# Patient Record
Sex: FEMALE | Race: BLACK OR AFRICAN AMERICAN | NOT HISPANIC OR LATINO | Employment: UNEMPLOYED | URBAN - METROPOLITAN AREA
[De-identification: names, ages, dates, MRNs, and addresses within clinical notes are randomized per-mention and may not be internally consistent; named-entity substitution may affect disease eponyms.]

---

## 2022-06-25 RX ORDER — ALBUTEROL SULFATE 90 UG/1
AEROSOL, METERED RESPIRATORY (INHALATION)
COMMUNITY
Start: 2021-12-26

## 2023-08-11 ENCOUNTER — HOSPITAL ENCOUNTER (EMERGENCY)
Facility: HOSPITAL | Age: 28
Discharge: HOME | End: 2023-08-11
Payer: MEDICAID

## 2023-08-11 VITALS
HEIGHT: 67 IN | HEART RATE: 87 BPM | SYSTOLIC BLOOD PRESSURE: 104 MMHG | BODY MASS INDEX: 45.99 KG/M2 | DIASTOLIC BLOOD PRESSURE: 66 MMHG | RESPIRATION RATE: 18 BRPM | WEIGHT: 293 LBS | TEMPERATURE: 98 F | OXYGEN SATURATION: 98 %

## 2023-08-11 DIAGNOSIS — U07.1 COVID-19: ICD-10-CM

## 2023-08-11 DIAGNOSIS — Z34.90 PREGNANCY, UNSPECIFIED GESTATIONAL AGE: Primary | ICD-10-CM

## 2023-08-11 DIAGNOSIS — R05.1 ACUTE COUGH: ICD-10-CM

## 2023-08-11 LAB
ALBUMIN SERPL-MCNC: 3.4 G/DL (ref 3.5–5.2)
ALBUMIN/GLOB SERPL: 1.1 G/DL
ALP SERPL-CCNC: 48 U/L (ref 39–117)
ALT SERPL W P-5'-P-CCNC: 55 U/L (ref 1–33)
ANION GAP SERPL CALCULATED.3IONS-SCNC: 12 MMOL/L (ref 5–15)
AST SERPL-CCNC: 44 U/L (ref 1–32)
BASOPHILS # BLD AUTO: 0 10*3/MM3 (ref 0–0.2)
BASOPHILS NFR BLD AUTO: 0.3 % (ref 0–1.5)
BILIRUB SERPL-MCNC: 0.4 MG/DL (ref 0–1.2)
BUN SERPL-MCNC: 5 MG/DL (ref 6–20)
BUN/CREAT SERPL: 8.9 (ref 7–25)
CALCIUM SPEC-SCNC: 8.9 MG/DL (ref 8.6–10.5)
CHLORIDE SERPL-SCNC: 104 MMOL/L (ref 98–107)
CO2 SERPL-SCNC: 22 MMOL/L (ref 22–29)
CREAT SERPL-MCNC: 0.56 MG/DL (ref 0.57–1)
CRP SERPL-MCNC: 1.54 MG/DL (ref 0–0.5)
DEPRECATED RDW RBC AUTO: 37.6 FL (ref 37–54)
EGFRCR SERPLBLD CKD-EPI 2021: 129.3 ML/MIN/1.73
EOSINOPHIL # BLD AUTO: 0.4 10*3/MM3 (ref 0–0.4)
EOSINOPHIL NFR BLD AUTO: 4 % (ref 0.3–6.2)
ERYTHROCYTE [DISTWIDTH] IN BLOOD BY AUTOMATED COUNT: 13.2 % (ref 12.3–15.4)
GLOBULIN UR ELPH-MCNC: 3.2 GM/DL
GLUCOSE SERPL-MCNC: 120 MG/DL (ref 65–99)
HCT VFR BLD AUTO: 35.3 % (ref 34–46.6)
HGB BLD-MCNC: 11.6 G/DL (ref 12–15.9)
HOLD SPECIMEN: NORMAL
LDH SERPL-CCNC: 280 U/L (ref 135–214)
LYMPHOCYTES # BLD AUTO: 2 10*3/MM3 (ref 0.7–3.1)
LYMPHOCYTES NFR BLD AUTO: 22.1 % (ref 19.6–45.3)
MCH RBC QN AUTO: 26.9 PG (ref 26.6–33)
MCHC RBC AUTO-ENTMCNC: 33 G/DL (ref 31.5–35.7)
MCV RBC AUTO: 81.7 FL (ref 79–97)
MONOCYTES # BLD AUTO: 0.8 10*3/MM3 (ref 0.1–0.9)
MONOCYTES NFR BLD AUTO: 8.5 % (ref 5–12)
NEUTROPHILS NFR BLD AUTO: 6 10*3/MM3 (ref 1.7–7)
NEUTROPHILS NFR BLD AUTO: 65.1 % (ref 42.7–76)
NRBC BLD AUTO-RTO: 0 /100 WBC (ref 0–0.2)
PLATELET # BLD AUTO: 268 10*3/MM3 (ref 140–450)
PMV BLD AUTO: 8.3 FL (ref 6–12)
POTASSIUM SERPL-SCNC: 4 MMOL/L (ref 3.5–5.2)
PROT SERPL-MCNC: 6.6 G/DL (ref 6–8.5)
RBC # BLD AUTO: 4.32 10*6/MM3 (ref 3.77–5.28)
SODIUM SERPL-SCNC: 138 MMOL/L (ref 136–145)
WBC NRBC COR # BLD: 9.2 10*3/MM3 (ref 3.4–10.8)
WHOLE BLOOD HOLD COAG: NORMAL

## 2023-08-11 PROCEDURE — 85025 COMPLETE CBC W/AUTO DIFF WBC: CPT | Performed by: NURSE PRACTITIONER

## 2023-08-11 PROCEDURE — 83615 LACTATE (LD) (LDH) ENZYME: CPT | Performed by: NURSE PRACTITIONER

## 2023-08-11 PROCEDURE — 80053 COMPREHEN METABOLIC PANEL: CPT | Performed by: NURSE PRACTITIONER

## 2023-08-11 PROCEDURE — 86140 C-REACTIVE PROTEIN: CPT | Performed by: NURSE PRACTITIONER

## 2023-08-11 PROCEDURE — 99283 EMERGENCY DEPT VISIT LOW MDM: CPT

## 2023-08-11 PROCEDURE — 93005 ELECTROCARDIOGRAM TRACING: CPT

## 2023-08-11 RX ORDER — SODIUM CHLORIDE 0.9 % (FLUSH) 0.9 %
10 SYRINGE (ML) INJECTION AS NEEDED
Status: DISCONTINUED | OUTPATIENT
Start: 2023-08-11 | End: 2023-08-11 | Stop reason: HOSPADM

## 2023-08-11 NOTE — ED NOTES
Pt reports cough that started last Wednesday when she tested positive for COVID. She is coughing up clear secretions and sometimes yellow or bloody secretions. Has SOA

## 2023-08-11 NOTE — ED PROVIDER NOTES
Subjective   History of Present Illness  Chief complaint: COVID-positive      Context: Patient is a 27-year-old female presents complaining of a cough.  States she tested positive for COVID 9 days ago after the family tested positive.  She states she has never had a and has not been vaccinated.  She is currently 17 weeks pregnant and denies any abdominal pain or cramping vaginal bleeding urinary complaints.  States she had a fever 9 days ago and none since then.        PCP:  jose      Review of Systems   Constitutional:  Negative for fever.     No past medical history on file.    No Known Allergies    No past surgical history on file.    No family history on file.    Social History     Socioeconomic History    Marital status: Single           Objective   Physical Exam    Vital signs and triage nurse note reviewed.  Constitutional: Awake, alert; well-developed and well-nourished. No acute distress is noted.  Nontoxic in appearance.  BMI over 47  HEENT: Normocephalic, atraumatic;  with intact EOM; oropharynx is pink and moist without exudate or erythema.  Neck: Supple, full range of motion without pain;    Cardiovascular: Regular rate and rhythm, normal S1-S2.  Pulmonary: Respiratory effort regular nonlabored, breath sounds clear to auscultation all fields.  Abdomen: Soft, nontender nondistended with normoactive bowel sounds; no rebound or guarding.  Musculoskeletal: Independent range of motion of all extremities with no palpable tenderness or edema.  Neuro: Alert oriented x3, speech is clear and appropriate, GCS 15  Skin:  Fleshtone warm, dry, intact;        Procedures           ED Course      Labs Reviewed   COMPREHENSIVE METABOLIC PANEL - Abnormal; Notable for the following components:       Result Value    Glucose 120 (*)     BUN 5 (*)     Creatinine 0.56 (*)     Albumin 3.4 (*)     ALT (SGPT) 55 (*)     AST (SGOT) 44 (*)     All other components within normal limits    Narrative:     GFR Normal >60  Chronic  "Kidney Disease <60  Kidney Failure <15     LACTATE DEHYDROGENASE - Abnormal; Notable for the following components:     (*)     All other components within normal limits   C-REACTIVE PROTEIN - Abnormal; Notable for the following components:    C-Reactive Protein 1.54 (*)     All other components within normal limits   CBC WITH AUTO DIFFERENTIAL - Abnormal; Notable for the following components:    Hemoglobin 11.6 (*)     All other components within normal limits   CBC AND DIFFERENTIAL    Narrative:     The following orders were created for panel order CBC & Differential.  Procedure                               Abnormality         Status                     ---------                               -----------         ------                     CBC Auto Differential[[id number]]        Abnormal            Final result                 Please view results for these tests on the individual orders.   EXTRA TUBES    Narrative:     The following orders were created for panel order Extra Tubes.  Procedure                               Abnormality         Status                     ---------                               -----------         ------                     Gold Top - SST[[id number]]                                   Final result               Light Blue Top[[id number]]                                   Final result                 Please view results for these tests on the individual orders.   GOLD TOP - SST   LIGHT BLUE TOP     Medications - No data to display  No radiology results for the last day  Prior to Admission medications    Medication Sig Start Date End Date Taking? Authorizing Provider   dextromethorphan 15 MG/5ML syrup Take 5 mL by mouth 4 (Four) Times a Day As Needed for Cough. 8/11/23   Vaishnavi Dee, NAYELY                                          Medical Decision Making      /66   Pulse 87   Temp 98 øF (36.7 øC)   Resp 18   Ht 170.2 cm (67\")   Wt (!) 139 kg (306 lb)   SpO2 98%   BMI " 47.93 kg/mý         Radiology interpretation: Considered but not felt emergently warranted as patient is pregnant  Lab interpretation:  Labs all viewed by me and significant for, glucose 120, mildly elevated LFTs, hemolyzed LDH at 280         Appropriate PPE worn during exam.  Patient had an IV established labs obtained, she has No clinical findings suspicious for bacterial pneumonia and is not hypoxic or requiring supplemental oxygen.  She is no longer a candidate for Paxlovid due to onset time of symptoms.  She will be treated symptomatically and we discussed importance of follow-up with her OB/GYN         i discussed findings with patient who voices understanding of discharge instructions, signs and symptoms requiring return to ED; discharged improved and in stable condition with follow up for re-evaluation.  This document is intended for medical expert use only. Reading of this document by patients and/or patient's family without participating medical staff guidance may result in misinterpretation and unintended morbidity.  Any interpretation of such data is the responsibility of the patient and/or family member responsible for the patient in concert with their primary or specialist providers, not to be left for sources of online searches such as [employer], [employer] or similar queries. Relying on these approaches to knowledge may result in misinterpretation, misguided goals of care and even death should patients or family members try recommendations outside of the realm of professional medical care in a supervised inpatient environment.         Problems Addressed:  Acute cough: complicated acute illness or injury  COVID-19: complicated acute illness or injury  Pregnancy, unspecified gestational age: complicated acute illness or injury    Amount and/or Complexity of Data Reviewed  Labs: ordered.    Risk  OTC drugs.  Prescription drug management.        Final diagnoses:   Pregnancy, unspecified gestational age    COVID-19   Acute cough       ED Disposition  ED Disposition       ED Disposition   Discharge    Condition   Stable    Comment   --               Tonya James MD  [address] [place] [address] [phone]    Schedule an appointment as soon as possible for a visit            Medication List        New Prescriptions      dextromethorphan 15 MG/5ML syrup  Take 5 mL by mouth 4 (Four) Times a Day As Needed for Cough.               Where to Get Your Medications        These medications were sent to [facility]/pharmacy #[id number] - [facility] [place] - [address] [place] [address] [phone] [place] [phone] [address] [place] [address]      Phone: [phone]   dextromethorphan 15 MG/5ML syrup            Vaishnavi Dee, APRN  08/12/23 1123

## 2023-08-11 NOTE — DISCHARGE INSTRUCTIONS
You will need to quarantine until 8/12/2023.  Medications as directed for cough.  May take Tylenol for fever or bodyaches.  Follow-up with your family doctor or her OB/GYN.  Return for any new or worsening symptoms

## 2023-08-17 LAB — QT INTERVAL: 376 MS

## 2023-10-17 ENCOUNTER — TELEPHONE (OUTPATIENT)
Dept: ENDOCRINOLOGY | Facility: CLINIC | Age: 28
End: 2023-10-17

## 2023-10-17 NOTE — TELEPHONE ENCOUNTER
Hub staff attempted to follow warm transfer process and was unsuccessful     Caller: Marilu Nickerson    Relationship to patient: Self    Best call back number: [phone]    Patient is needing: PATIENT NEEDS TO CANCEL HER APPT FOR TODAY 10/17/23 WITH [facility] DUE TO TRANSPORTATION. PLEASE CALL HER TO RESCHEDULE HER APPT.

## 2024-02-22 ENCOUNTER — HOSPITAL ENCOUNTER (EMERGENCY)
Facility: HOSPITAL | Age: 29
Discharge: HOME | End: 2024-02-22
Attending: EMERGENCY MEDICINE
Payer: MEDICAID

## 2024-02-22 ENCOUNTER — APPOINTMENT (OUTPATIENT)
Dept: GENERAL RADIOLOGY | Facility: HOSPITAL | Age: 29
End: 2024-02-22
Payer: MEDICAID

## 2024-02-22 VITALS
BODY MASS INDEX: 45.99 KG/M2 | HEART RATE: 122 BPM | OXYGEN SATURATION: 95 % | WEIGHT: 293 LBS | TEMPERATURE: 98.7 F | SYSTOLIC BLOOD PRESSURE: 123 MMHG | DIASTOLIC BLOOD PRESSURE: 82 MMHG | RESPIRATION RATE: 23 BRPM | HEIGHT: 67 IN

## 2024-02-22 DIAGNOSIS — J10.1 INFLUENZA B: Primary | ICD-10-CM

## 2024-02-22 LAB
FLUAV RNA RESP QL NAA+PROBE: NOT DETECTED
FLUBV RNA RESP QL NAA+PROBE: DETECTED
RSV RNA RESP QL NAA+PROBE: NOT DETECTED
SARS-COV-2 RNA RESP QL NAA+PROBE: NOT DETECTED

## 2024-02-22 PROCEDURE — 99283 EMERGENCY DEPT VISIT LOW MDM: CPT

## 2024-02-22 PROCEDURE — 63710000001 DEXAMETHASONE PER 0.25 MG: Performed by: EMERGENCY MEDICINE

## 2024-02-22 PROCEDURE — 71046 X-RAY EXAM CHEST 2 VIEWS: CPT

## 2024-02-22 PROCEDURE — 94640 AIRWAY INHALATION TREATMENT: CPT

## 2024-02-22 PROCEDURE — 94799 UNLISTED PULMONARY SVC/PX: CPT

## 2024-02-22 PROCEDURE — 87637 SARSCOV2&INF A&B&RSV AMP PRB: CPT | Performed by: EMERGENCY MEDICINE

## 2024-02-22 RX ORDER — DEXAMETHASONE 4 MG/1
10 TABLET ORAL ONCE
Status: COMPLETED | OUTPATIENT
Start: 2024-02-22 | End: 2024-02-22

## 2024-02-22 RX ADMIN — DEXAMETHASONE 10 MG: 4 TABLET ORAL at 13:41

## 2024-02-22 RX ADMIN — RACEPINEPHRINE HYDROCHLORIDE 0.5 ML: 11.25 SOLUTION RESPIRATORY (INHALATION) at 14:42

## 2024-02-22 NOTE — ED NOTES
Pt ambulates to ED PO14. Pt reports non productive cough since Tuesday along with SOA, HA and chest pain. Pt reports she had a fever of 101 yesterday. She has tried taking cold medication without any relief. Pt reports that her headache is sharp when she coughs and her chest hurts when she coughs. No other complaints at this time.

## 2024-02-22 NOTE — DISCHARGE INSTRUCTIONS
May use Tylenol or ibuprofen for aches or fever.  May use Mucinex DM for cough congestion.  Follow-up with primary provider, return new or worsening symptoms

## 2024-02-22 NOTE — ED PROVIDER NOTES
Subjective   History of Present Illness  28-year-old female presents with cough starting 2 days ago.  She reports temperature 101 yesterday.  She has had no vomiting no diarrhea.  She has had some headache.  Cough has been nonproductive.  She complains of some chest pain with cough.  She complains of some sore throat with cough.  Review of Systems  She was on insulin during pregnancy.  Past Medical History:   Diagnosis Date    Asthma     Diabetes        No Known Allergies    History reviewed. No pertinent surgical history.    History reviewed. No pertinent family history.    Social History     Socioeconomic History    Marital status: Single   Tobacco Use    Smoking status: Never    Smokeless tobacco: Never   Substance and Sexual Activity    Alcohol use: Never    Drug use: Never     Only current medication birth control pill.  She states she had been prescribed metformin but has not started this yet.      Objective   Physical Exam  28-year-old female awake alert.  Generally overweight.  Pupils equal round react light.  Oropharynx clear neck supple chest clear equal breath sounds.  She has croupy sound to cough.  Cardiovascular rhythm rate and rhythm abdomen soft nontender.  Neurologic exam without focal findings noted  Procedures           ED Course      Results for orders placed or performed during the hospital encounter of 02/22/24   COVID-19, FLU A/B, RSV PCR 1 HR TAT - Swab, Nasopharynx    Specimen: Nasopharynx; Swab   Result Value Ref Range    COVID19 Not Detected Not Detected - Ref. Range    Influenza A PCR Not Detected Not Detected    Influenza B PCR Detected (A) Not Detected    RSV, PCR Not Detected Not Detected     XR Chest 2 View    Result Date: 2/22/2024  Impression: No acute cardiopulmonary findings. Electronically Signed: Familia Briones MD  2/22/2024 2:11 PM EST  Workstation ID: [id number]   Medications   dexAMETHasone (DECADRON) tablet 10 mg (10 mg Oral Given 2/22/24 1348)   racemic epinephrine  (RACEPINEPHRINE) nebulizer solution 0.5 mL (0.5 mL Nebulization Given 24 8245)     .VS                                       Medical Decision Making  Amount and/or Complexity of Data Reviewed  Radiology: ordered.    Risk  OTC drugs.  Prescription drug management.    Chart review: Patient has history of gestational diabetes and also tested positive for COVID August of last year  Comorbidity: As per past history   Differential: Bronchitis, pneumonia, reactive airway disease, viral illness  My EKG interpretation: Not indicated  Lab: COVID RSV not detected influenza B detected  My Radiology review and interpretation: Chest x-ray 2 views reveals clear lungs without evidence of pneumonia or pneumothorax  Discussion/treatment: Patient was given Decadron 10 mg along with racemic epi mini neb.  She had improvement in her cough with treatment.  Patient's findings were discussed with her.  She was discharged.  Advised Tylenol or ibuprofen for aches or fever Mucinex DM for cough to wear mask around her  to follow-up with primary provider, return new or worsening symptoms.  Since she has had symptoms for greater than 48 hours would not expect any benefit to Tamiflu.  Patient was evaluated using appropriate PPE      Final diagnoses:   Influenza B       ED Disposition  ED Disposition       ED Disposition   Discharge    Condition   Stable    Comment   --               PATIENT CONNECTION - [place] [address]  [phone]             Medication List      No changes were made to your prescriptions during this visit.            Chaz Bartlett MD  [id number]